# Patient Record
Sex: FEMALE | Race: WHITE | NOT HISPANIC OR LATINO
[De-identification: names, ages, dates, MRNs, and addresses within clinical notes are randomized per-mention and may not be internally consistent; named-entity substitution may affect disease eponyms.]

---

## 2017-07-13 ENCOUNTER — APPOINTMENT (OUTPATIENT)
Dept: PULMONOLOGY | Facility: CLINIC | Age: 72
End: 2017-07-13

## 2017-07-13 VITALS
OXYGEN SATURATION: 99 % | DIASTOLIC BLOOD PRESSURE: 60 MMHG | HEART RATE: 67 BPM | SYSTOLIC BLOOD PRESSURE: 110 MMHG | BODY MASS INDEX: 25.15 KG/M2 | WEIGHT: 142 LBS

## 2018-01-08 ENCOUNTER — APPOINTMENT (OUTPATIENT)
Dept: PULMONOLOGY | Facility: CLINIC | Age: 73
End: 2018-01-08
Payer: MEDICARE

## 2018-01-08 VITALS
WEIGHT: 144 LBS | BODY MASS INDEX: 25.51 KG/M2 | SYSTOLIC BLOOD PRESSURE: 125 MMHG | OXYGEN SATURATION: 99 % | DIASTOLIC BLOOD PRESSURE: 55 MMHG | HEART RATE: 67 BPM

## 2018-01-08 DIAGNOSIS — Z23 ENCOUNTER FOR IMMUNIZATION: ICD-10-CM

## 2018-01-08 PROCEDURE — 94010 BREATHING CAPACITY TEST: CPT

## 2018-01-08 PROCEDURE — 99214 OFFICE O/P EST MOD 30 MIN: CPT | Mod: 25

## 2018-01-08 RX ORDER — BRIMONIDINE TARTRATE 1 MG/ML
0.1 SOLUTION/ DROPS OPHTHALMIC
Refills: 0 | Status: ACTIVE | COMMUNITY

## 2018-01-09 ENCOUNTER — MED ADMIN CHARGE (OUTPATIENT)
Age: 73
End: 2018-01-09

## 2018-03-16 ENCOUNTER — MEDICATION RENEWAL (OUTPATIENT)
Age: 73
End: 2018-03-16

## 2018-05-07 ENCOUNTER — APPOINTMENT (OUTPATIENT)
Dept: PULMONOLOGY | Facility: CLINIC | Age: 73
End: 2018-05-07
Payer: MEDICARE

## 2018-05-07 VITALS
SYSTOLIC BLOOD PRESSURE: 122 MMHG | DIASTOLIC BLOOD PRESSURE: 80 MMHG | BODY MASS INDEX: 25.69 KG/M2 | HEART RATE: 72 BPM | WEIGHT: 145 LBS | OXYGEN SATURATION: 99 % | RESPIRATION RATE: 16 BRPM

## 2018-05-07 PROCEDURE — 99215 OFFICE O/P EST HI 40 MIN: CPT | Mod: 25

## 2018-05-07 PROCEDURE — 94010 BREATHING CAPACITY TEST: CPT

## 2018-05-18 ENCOUNTER — MEDICATION RENEWAL (OUTPATIENT)
Age: 73
End: 2018-05-18

## 2018-11-15 ENCOUNTER — APPOINTMENT (OUTPATIENT)
Dept: PULMONOLOGY | Facility: CLINIC | Age: 73
End: 2018-11-15
Payer: MEDICARE

## 2018-11-15 VITALS
RESPIRATION RATE: 16 BRPM | SYSTOLIC BLOOD PRESSURE: 122 MMHG | HEART RATE: 74 BPM | DIASTOLIC BLOOD PRESSURE: 80 MMHG | OXYGEN SATURATION: 98 % | BODY MASS INDEX: 24.62 KG/M2 | WEIGHT: 139 LBS

## 2018-11-15 PROCEDURE — 99214 OFFICE O/P EST MOD 30 MIN: CPT

## 2019-02-26 ENCOUNTER — MEDICATION RENEWAL (OUTPATIENT)
Age: 74
End: 2019-02-26

## 2019-04-15 ENCOUNTER — APPOINTMENT (OUTPATIENT)
Dept: PULMONOLOGY | Facility: CLINIC | Age: 74
End: 2019-04-15
Payer: MEDICARE

## 2019-04-15 VITALS — WEIGHT: 140 LBS | BODY MASS INDEX: 24.8 KG/M2

## 2019-04-15 VITALS — SYSTOLIC BLOOD PRESSURE: 124 MMHG | HEART RATE: 83 BPM | DIASTOLIC BLOOD PRESSURE: 60 MMHG | OXYGEN SATURATION: 99 %

## 2019-04-15 PROCEDURE — 94664 DEMO&/EVAL PT USE INHALER: CPT | Mod: 59

## 2019-04-15 PROCEDURE — 94060 EVALUATION OF WHEEZING: CPT

## 2019-04-15 PROCEDURE — 99214 OFFICE O/P EST MOD 30 MIN: CPT | Mod: 25

## 2019-04-15 RX ORDER — LOSARTAN POTASSIUM 25 MG/1
25 TABLET, FILM COATED ORAL
Refills: 0 | Status: ACTIVE | COMMUNITY
Start: 2019-04-15

## 2019-04-15 NOTE — DISCUSSION/SUMMARY
[FreeTextEntry1] : Asthma with reversible restriction, worsened by allergies\par remains on advair bid, allegra and mucinex\par exam without bronchospasm, normal sp02\par medrol taper\par recent CXR 1/18 negative, never smoker\par Cardiology following\par Will reevaluate in 4  months or sooner if needed with andrew\par remains off amiodarone, had ablation 2 yrs ago, on asa\par

## 2019-04-15 NOTE — PROCEDURE
[FreeTextEntry1] : spirometry with reversible restriction, decreased flows from 5/18\par CXR 1/18 negative

## 2019-04-15 NOTE — CONSULT LETTER
[Consult Letter:] : I had the pleasure of evaluating your patient, [unfilled]. [Dear  ___] : Dear  [unfilled], [Please see my note below.] : Please see my note below. [Sincerely,] : Sincerely, [DrBarbara  ___] : Dr. HILL [Director, Respiratory Care] : Director, Respiratory Care [[facility], [facility]] : [facility], [facility] [[facility]] : [facility] [FreeTextEntry3] : [facility]\par Pul[facility] Critical Care\par Director Pulmonary Division\par Medical Director Respiratory Therapy\par [facility]\par \par

## 2019-04-15 NOTE — HISTORY OF PRESENT ILLNESS
[FreeTextEntry1] : no sig dyspnea\par No fever chills or chest pain\par no sputum\par using advair bid\par has home neb and prn rescue\par

## 2019-04-15 NOTE — PHYSICAL EXAM
[Normal Appearance] : normal appearance [General Appearance - Well Developed] : well developed [Well Groomed] : well groomed [General Appearance - Well Nourished] : well nourished [No Deformities] : no deformities [General Appearance - In No Acute Distress] : no acute distress [Murmurs] : no murmurs present [Heart Sounds] : normal S1 and S2 [Heart Rate And Rhythm] : heart rate and rhythm were normal [Edema] : no peripheral edema present [Exaggerated Use Of Accessory Muscles For Inspiration] : no accessory muscle use [Respiration, Rhythm And Depth] : normal respiratory rhythm and effort [Auscultation Breath Sounds / Voice Sounds] : lungs were clear to auscultation bilaterally [Abnormal Walk] : normal gait [] : no rash [Oriented To Time, Place, And Person] : oriented to person, place, and time [No Focal Deficits] : no focal deficits [Impaired Insight] : insight and judgment were intact [Memory Recent] : recent memory was not impaired [Affect] : the affect was normal [Cyanosis, Localized] : no localized cyanosis [Nail Clubbing] : no clubbing of the fingernails [FreeTextEntry1] : no chest wall abn

## 2019-07-12 ENCOUNTER — RX RENEWAL (OUTPATIENT)
Age: 74
End: 2019-07-12

## 2019-08-15 ENCOUNTER — APPOINTMENT (OUTPATIENT)
Dept: PULMONOLOGY | Facility: CLINIC | Age: 74
End: 2019-08-15
Payer: MEDICARE

## 2019-08-15 VITALS — HEIGHT: 63 IN

## 2019-08-15 VITALS
OXYGEN SATURATION: 99 % | SYSTOLIC BLOOD PRESSURE: 130 MMHG | HEART RATE: 92 BPM | DIASTOLIC BLOOD PRESSURE: 80 MMHG | BODY MASS INDEX: 24.27 KG/M2 | WEIGHT: 137 LBS

## 2019-08-15 PROCEDURE — 99214 OFFICE O/P EST MOD 30 MIN: CPT | Mod: 25

## 2019-08-15 PROCEDURE — 94010 BREATHING CAPACITY TEST: CPT

## 2019-08-15 NOTE — PHYSICAL EXAM
[General Appearance - Well Developed] : well developed [Normal Appearance] : normal appearance [Well Groomed] : well groomed [General Appearance - Well Nourished] : well nourished [No Deformities] : no deformities [General Appearance - In No Acute Distress] : no acute distress [Heart Rate And Rhythm] : heart rate and rhythm were normal [Heart Sounds] : normal S1 and S2 [Murmurs] : no murmurs present [Edema] : no peripheral edema present [Respiration, Rhythm And Depth] : normal respiratory rhythm and effort [Exaggerated Use Of Accessory Muscles For Inspiration] : no accessory muscle use [Auscultation Breath Sounds / Voice Sounds] : lungs were clear to auscultation bilaterally [Abnormal Walk] : normal gait [] : no rash [No Focal Deficits] : no focal deficits [Oriented To Time, Place, And Person] : oriented to person, place, and time [Affect] : the affect was normal [Impaired Insight] : insight and judgment were intact [Memory Recent] : recent memory was not impaired [Nail Clubbing] : no clubbing of the fingernails [Cyanosis, Localized] : no localized cyanosis [FreeTextEntry1] : no chest wall abn

## 2019-08-15 NOTE — DISCUSSION/SUMMARY
[FreeTextEntry1] : Asthma by hx, has mild restriction, worsening fatigue more than dyspnea per pt\par exam without bronchospasm, normal sp02, spirometry improved\par hemolytic anemia work up in progress, CT scan planned\par does not want ER currently\par remains on advair bid,\par recent CXR 7/19 negative, never smoker\par Cardiology also following\par Will reevaluate in 3  months or sooner if needed with andrew\par remains off amiodarone, had ablation 2 yrs ago, on asa\par

## 2019-08-15 NOTE — HISTORY OF PRESENT ILLNESS
[FreeTextEntry1] : saw Hematology, Hb decreasing, getting labs, and CT scan through hematology\par more dyspnea\par no edema, no irregular beats\par No fever chills or chest pain\par no sputum\par using advair bid\par has home neb and prn rescue\par has Cardiology eval planned\par

## 2019-08-15 NOTE — CONSULT LETTER
[Dear  ___] : Dear  [unfilled], [Consult Letter:] : I had the pleasure of evaluating your patient, [unfilled]. [Sincerely,] : Sincerely, [Please see my note below.] : Please see my note below. [DrBarbara  ___] : Dr. HILL [[facility], [facility]] : [facility], [facility] [Director, Respiratory Care] : Director, Respiratory Care [[facility]] : [facility] [FreeTextEntry3] : [facility]\par Pul[facility] Critical Care\par Director Pulmonary Division\par Medical Director Respiratory Therapy\par [facility]\par \par  [DrBarbara ___] : Dr. HILL

## 2019-11-21 ENCOUNTER — APPOINTMENT (OUTPATIENT)
Dept: PULMONOLOGY | Facility: CLINIC | Age: 74
End: 2019-11-21
Payer: MEDICARE

## 2019-11-21 VITALS
HEIGHT: 63 IN | BODY MASS INDEX: 23.92 KG/M2 | DIASTOLIC BLOOD PRESSURE: 64 MMHG | WEIGHT: 135 LBS | SYSTOLIC BLOOD PRESSURE: 112 MMHG

## 2019-11-21 VITALS — OXYGEN SATURATION: 99 % | HEART RATE: 84 BPM

## 2019-11-21 PROCEDURE — 99214 OFFICE O/P EST MOD 30 MIN: CPT | Mod: 25

## 2019-11-21 PROCEDURE — 90732 PPSV23 VACC 2 YRS+ SUBQ/IM: CPT

## 2019-11-21 PROCEDURE — G0009: CPT

## 2019-11-21 RX ORDER — FOLIC ACID 1 MG/1
1 TABLET ORAL
Refills: 0 | Status: ACTIVE | COMMUNITY
Start: 2019-11-21

## 2019-11-21 RX ORDER — IRON/IRON ASP GLY/FA/MV-MIN 38 125-25-1MG
TABLET ORAL
Refills: 0 | Status: ACTIVE | COMMUNITY

## 2019-11-21 RX ORDER — ASPIRIN 81 MG/1
81 TABLET ORAL
Refills: 0 | Status: ACTIVE | COMMUNITY
Start: 2019-11-21

## 2019-11-21 NOTE — DISCUSSION/SUMMARY
[FreeTextEntry1] : Asthma with reversible restriction, at baseline\par remains on advair bid, allegra and mucinex\par exam without bronchospasm, normal sp02\par recent CT chest 8/19 with apical scarring, needs repeat 6 months\par Cardiology following\par Will reevaluate in 6  months or sooner if needed with andrew\par now on MTX\par vaccinations this fall, pneumo 23 given\par

## 2019-11-21 NOTE — HISTORY OF PRESENT ILLNESS
[FreeTextEntry1] : no sig dyspnea\par No fever chills or chest pain\par no sputum\par using advair bid\par has home neb and prn rescue\par anemia stable saw Hematology\par recently diagnosed as seronegative RA, on MTX \par

## 2019-11-21 NOTE — PHYSICAL EXAM
[General Appearance - Well Developed] : well developed [Normal Appearance] : normal appearance [Well Groomed] : well groomed [General Appearance - Well Nourished] : well nourished [No Deformities] : no deformities [General Appearance - In No Acute Distress] : no acute distress [Heart Rate And Rhythm] : heart rate and rhythm were normal [Heart Sounds] : normal S1 and S2 [Murmurs] : no murmurs present [Edema] : no peripheral edema present [Respiration, Rhythm And Depth] : normal respiratory rhythm and effort [Exaggerated Use Of Accessory Muscles For Inspiration] : no accessory muscle use [Auscultation Breath Sounds / Voice Sounds] : lungs were clear to auscultation bilaterally [FreeTextEntry1] : no chest wall abn [Abnormal Walk] : normal gait [] : no rash [No Focal Deficits] : no focal deficits [Oriented To Time, Place, And Person] : oriented to person, place, and time [Impaired Insight] : insight and judgment were intact [Affect] : the affect was normal [Memory Recent] : recent memory was not impaired [Nail Clubbing] : no clubbing of the fingernails [Cyanosis, Localized] : no localized cyanosis

## 2019-11-21 NOTE — CONSULT LETTER
[Dear  ___] : Dear  [unfilled], [Consult Letter:] : I had the pleasure of evaluating your patient, [unfilled]. [Please see my note below.] : Please see my note below. [Sincerely,] : Sincerely, [FreeTextEntry3] : [facility] DO [facility]\par Pulmonary Critical Care\par Director Pulmonary Division\par Medical Director Respiratory Therapy\par [facility]\par \par  [DrBarbara  ___] : Dr. HILL [[facility], [facility]] : [facility], [facility] [Director, Respiratory Care] : Director, Respiratory Care [[facility]] : [facility]

## 2020-04-22 ENCOUNTER — RX RENEWAL (OUTPATIENT)
Age: 75
End: 2020-04-22

## 2020-04-23 RX ORDER — ALBUTEROL SULFATE 90 UG/1
108 (90 BASE) INHALANT RESPIRATORY (INHALATION)
Qty: 1 | Refills: 3 | Status: DISCONTINUED | COMMUNITY
Start: 2020-04-22 | End: 2020-04-23

## 2020-04-23 RX ORDER — ALBUTEROL SULFATE 2.5 MG/3ML
(2.5 MG/3ML) SOLUTION RESPIRATORY (INHALATION)
Qty: 1620 | Refills: 3 | Status: DISCONTINUED | COMMUNITY
Start: 2019-04-15 | End: 2020-04-23

## 2020-04-23 RX ORDER — METHYLPREDNISOLONE 4 MG/1
4 TABLET ORAL
Qty: 1 | Refills: 3 | Status: DISCONTINUED | COMMUNITY
Start: 2019-04-15 | End: 2020-04-23

## 2020-05-19 ENCOUNTER — APPOINTMENT (OUTPATIENT)
Dept: PULMONOLOGY | Facility: CLINIC | Age: 75
End: 2020-05-19

## 2020-08-26 ENCOUNTER — APPOINTMENT (OUTPATIENT)
Dept: PULMONOLOGY | Facility: CLINIC | Age: 75
End: 2020-08-26
Payer: MEDICARE

## 2020-08-26 VITALS — SYSTOLIC BLOOD PRESSURE: 124 MMHG | DIASTOLIC BLOOD PRESSURE: 58 MMHG | BODY MASS INDEX: 23.91 KG/M2 | WEIGHT: 135 LBS

## 2020-08-26 VITALS — HEART RATE: 80 BPM | OXYGEN SATURATION: 98 %

## 2020-08-26 PROCEDURE — 99214 OFFICE O/P EST MOD 30 MIN: CPT

## 2020-08-26 RX ORDER — METHOTREXATE 2.5 MG/1
2.5 TABLET ORAL
Refills: 0 | Status: DISCONTINUED | COMMUNITY
Start: 2019-11-21 | End: 2020-08-26

## 2020-08-26 RX ORDER — ABATACEPT 250 MG/15ML
250 INJECTION, POWDER, LYOPHILIZED, FOR SOLUTION INTRAVENOUS
Refills: 0 | Status: ACTIVE | COMMUNITY

## 2020-08-26 NOTE — PHYSICAL EXAM
[General Appearance - Well Developed] : well developed [Normal Appearance] : normal appearance [Well Groomed] : well groomed [General Appearance - Well Nourished] : well nourished [Heart Rate And Rhythm] : heart rate and rhythm were normal [No Deformities] : no deformities [General Appearance - In No Acute Distress] : no acute distress [Heart Sounds] : normal S1 and S2 [Murmurs] : no murmurs present [Edema] : no peripheral edema present [Respiration, Rhythm And Depth] : normal respiratory rhythm and effort [Exaggerated Use Of Accessory Muscles For Inspiration] : no accessory muscle use [Abnormal Walk] : normal gait [Auscultation Breath Sounds / Voice Sounds] : lungs were clear to auscultation bilaterally [FreeTextEntry1] : no chest wall abn [] : no rash [No Focal Deficits] : no focal deficits [Impaired Insight] : insight and judgment were intact [Oriented To Time, Place, And Person] : oriented to person, place, and time [Affect] : the affect was normal [Memory Recent] : recent memory was not impaired [Cyanosis, Localized] : no localized cyanosis [Nail Clubbing] : no clubbing of the fingernails

## 2020-08-26 NOTE — DISCUSSION/SUMMARY
[FreeTextEntry1] : Asthma with reversible restriction, at baseline\par remains on advair bid, allegra and mucinex\par exam without bronchospasm, normal sp02\par recent CT chest 1/20  with stable apical scarring 5 mm ( 8/19-1/20), repeat CT scan 1/21\par Cardiology following\par Will reevaluate in 6  months or sooner if needed with Pfts \par now on MTX, Orencia\par by hx TB testing negative\par vaccinations this fall,\par 6 months or sooner if needed\par

## 2020-08-26 NOTE — CONSULT LETTER
[Dear  ___] : Dear  [unfilled], [Consult Letter:] : I had the pleasure of evaluating your patient, [unfilled]. [Sincerely,] : Sincerely, [FreeTextEntry3] : [facility]\par Pul[facility] Critical Care\par Director Pulmonary Division\par Medical Director Respiratory Therapy\par [facility]\par \par  [Please see my note below.] : Please see my note below. [DrBarbara  ___] : Dr. HILL [[facility], [facility]] : [facility], [facility] [[facility]] : [facility] [Director, Respiratory Care] : Director, Respiratory Care

## 2020-08-26 NOTE — HISTORY OF PRESENT ILLNESS
[FreeTextEntry1] : \par  [TextBox_4] : no sig dyspnea\par No fever chills or chest pain\par no sputum\par using advair bid\par has home neb and prn rescue\par anemia stable saw Hematology\par  seronegative RA, on MTX , Orencia

## 2020-10-30 ENCOUNTER — RX RENEWAL (OUTPATIENT)
Age: 75
End: 2020-10-30

## 2021-05-25 ENCOUNTER — RX CHANGE (OUTPATIENT)
Age: 76
End: 2021-05-25

## 2021-05-26 ENCOUNTER — APPOINTMENT (OUTPATIENT)
Dept: PULMONOLOGY | Facility: CLINIC | Age: 76
End: 2021-05-26
Payer: MEDICARE

## 2021-05-26 VITALS
HEART RATE: 77 BPM | OXYGEN SATURATION: 98 % | SYSTOLIC BLOOD PRESSURE: 132 MMHG | DIASTOLIC BLOOD PRESSURE: 68 MMHG | BODY MASS INDEX: 24.98 KG/M2 | HEIGHT: 63 IN | RESPIRATION RATE: 16 BRPM | WEIGHT: 141 LBS | TEMPERATURE: 97 F

## 2021-05-26 PROCEDURE — 99213 OFFICE O/P EST LOW 20 MIN: CPT

## 2021-05-26 PROCEDURE — 99072 ADDL SUPL MATRL&STAF TM PHE: CPT

## 2021-05-26 RX ORDER — TERIPARATIDE 250 UG/ML
600 INJECTION, SOLUTION SUBCUTANEOUS
Refills: 0 | Status: DISCONTINUED | COMMUNITY
Start: 2019-04-15 | End: 2021-05-26

## 2021-05-26 RX ORDER — METHOTREXATE 2.5 MG/1
2.5 TABLET ORAL
Refills: 0 | Status: DISCONTINUED | COMMUNITY
End: 2021-05-26

## 2021-05-26 NOTE — CONSULT LETTER
[Dear  ___] : Dear  [unfilled], [Consult Letter:] : I had the pleasure of evaluating your patient, [unfilled]. [Please see my note below.] : Please see my note below. [Sincerely,] : Sincerely, [DrBarbara  ___] : Dr. HILL [[facility], [facility]] : [facility], [facility] [Director, Respiratory Care] : Director, Respiratory Care [[facility]] : [facility] [FreeTextEntry3] : [facility] DO [facility]\par Pulmonary Critical Care\par Director Pulmonary Division\par Medical Director Respiratory Therapy\par [facility]\par \par

## 2021-05-26 NOTE — DISCUSSION/SUMMARY
[FreeTextEntry1] : Asthma with reversible restriction, at baseline\par remains on advair bid, allegra and mucinex\par exam without bronchospasm, normal sp02\par recent CT chest 2/21  with stable apical scarring 5 mm ( 8/19-2/21\par Cardiology following\par Will reevaluate in 6  months or sooner if needed , refuses PFts currently\par now on MTX, Orencia\par by hx TB testing negative\par Had Covid vaccination\par 6 months or sooner if needed\par

## 2021-05-26 NOTE — HISTORY OF PRESENT ILLNESS
[TextBox_4] : no sig dyspnea\par No fever chills or chest pain\par no sputum\par using advair bid\par has home neb and prn rescue\par anemia stable saw Hematology\par  seronegative RA, on MTX , Orencia [FreeTextEntry1] : \par

## 2021-05-26 NOTE — END OF VISIT
[>50% of the face to face encounter time was spent on counseling and/or coordination of care for ___] : Greater than 50% of the face to face encounter time was spent on counseling and/or coordination of care for [unfilled] [>50% of Time Spent on Counseling and Coordination of Care for  ___] : Greater than 50% of the encounter time was spent on counseling and coordination of care for [unfilled] [Time Spent: ___ minutes] : I have spent [unfilled] minutes of face to face time with the patient

## 2021-05-26 NOTE — PHYSICAL EXAM
[General Appearance - Well Developed] : well developed [Normal Appearance] : normal appearance [Well Groomed] : well groomed [General Appearance - Well Nourished] : well nourished [No Deformities] : no deformities [General Appearance - In No Acute Distress] : no acute distress [Heart Rate And Rhythm] : heart rate and rhythm were normal [Heart Sounds] : normal S1 and S2 [Murmurs] : no murmurs present [Edema] : no peripheral edema present [Respiration, Rhythm And Depth] : normal respiratory rhythm and effort [Exaggerated Use Of Accessory Muscles For Inspiration] : no accessory muscle use [Auscultation Breath Sounds / Voice Sounds] : lungs were clear to auscultation bilaterally [Abnormal Walk] : normal gait [] : no rash [No Focal Deficits] : no focal deficits [Oriented To Time, Place, And Person] : oriented to person, place, and time [Impaired Insight] : insight and judgment were intact [Affect] : the affect was normal [Memory Recent] : recent memory was not impaired [Nail Clubbing] : no clubbing of the fingernails [Cyanosis, Localized] : no localized cyanosis [FreeTextEntry1] : no chest wall abn

## 2021-06-23 ENCOUNTER — RX CHANGE (OUTPATIENT)
Age: 76
End: 2021-06-23

## 2021-08-19 ENCOUNTER — RX CHANGE (OUTPATIENT)
Age: 76
End: 2021-08-19

## 2021-10-06 ENCOUNTER — RX CHANGE (OUTPATIENT)
Age: 76
End: 2021-10-06

## 2021-11-08 ENCOUNTER — APPOINTMENT (OUTPATIENT)
Dept: PULMONOLOGY | Facility: CLINIC | Age: 76
End: 2021-11-08
Payer: MEDICARE

## 2021-11-08 VITALS — DIASTOLIC BLOOD PRESSURE: 80 MMHG | SYSTOLIC BLOOD PRESSURE: 146 MMHG | HEART RATE: 86 BPM | OXYGEN SATURATION: 98 %

## 2021-11-08 PROCEDURE — 99213 OFFICE O/P EST LOW 20 MIN: CPT

## 2021-11-08 NOTE — HISTORY OF PRESENT ILLNESS
[TextBox_4] : no sig dyspnea\par No fever chills or chest pain\par no sputum\par using advair bid\par has home neb and prn rescue\par anemia stable saw Hematology\par  seronegative RA, on  Orencia\par Had Moderna vaccine booster [FreeTextEntry1] : \par

## 2021-11-08 NOTE — DISCUSSION/SUMMARY
[FreeTextEntry1] : Asthma with reversible restriction, at baseline\par remains on advair bid, allegra and mucinex\par exam without bronchospasm, normal sp02\par recent CT chest 2/21  with stable apical scarring 5 mm ( 8/19-2/21\par Cardiology following\par Will reevaluate in 6  months or sooner if needed , refuses PFts currently\par now on Orencia\par by hx TB testing negative\par Had Covid vaccination and Booster Moderna\par 6 months or sooner if needed\par

## 2021-11-08 NOTE — CONSULT LETTER
[Dear  ___] : Dear  [unfilled], [Consult Letter:] : I had the pleasure of evaluating your patient, [unfilled]. [Please see my note below.] : Please see my note below. [Sincerely,] : Sincerely, [FreeTextEntry3] : [facility] DO [facility]\par Pulmonary Critical Care\par Director Pulmonary Division\par Medical Director Respiratory Therapy\par [facility]\par \par  [DrBarbara  ___] : Dr. HILL [[facility]yn[facility] Taylor DO, [facility]] : Mynor Taylor DO, [facility] [Director, Respiratory Care] : Director, Respiratory Care [[facility]] : [facility]

## 2021-12-10 ENCOUNTER — RX CHANGE (OUTPATIENT)
Age: 76
End: 2021-12-10

## 2022-03-04 ENCOUNTER — RX CHANGE (OUTPATIENT)
Age: 77
End: 2022-03-04

## 2022-04-15 ENCOUNTER — RX CHANGE (OUTPATIENT)
Age: 77
End: 2022-04-15

## 2022-05-12 ENCOUNTER — NON-APPOINTMENT (OUTPATIENT)
Age: 77
End: 2022-05-12

## 2022-05-12 ENCOUNTER — APPOINTMENT (OUTPATIENT)
Dept: PULMONOLOGY | Facility: CLINIC | Age: 77
End: 2022-05-12
Payer: MEDICARE

## 2022-05-12 VITALS
DIASTOLIC BLOOD PRESSURE: 78 MMHG | BODY MASS INDEX: 26.58 KG/M2 | RESPIRATION RATE: 16 BRPM | HEIGHT: 63 IN | OXYGEN SATURATION: 97 % | WEIGHT: 150 LBS | HEART RATE: 84 BPM | SYSTOLIC BLOOD PRESSURE: 138 MMHG

## 2022-05-12 DIAGNOSIS — R91.8 OTHER NONSPECIFIC ABNORMAL FINDING OF LUNG FIELD: ICD-10-CM

## 2022-05-12 PROCEDURE — 99214 OFFICE O/P EST MOD 30 MIN: CPT

## 2022-05-12 NOTE — DISCUSSION/SUMMARY
[FreeTextEntry1] : Asthma with reversible restriction, at baseline\par remains on advair bid, allegra and mucinex\par exam without bronchospasm, normal sp02\par recent CT chest 2/21  with stable apical scarring 5 mm ( 8/19-2/21\par Cardiology following\par Will reevaluate in 6  months or sooner if needed , refuses PFts currently\par now on Orencia\par by hx TB testing negative\par Had Covid vaccination and Booster Moderna x 2\par 6 months or sooner if needed\par

## 2022-05-12 NOTE — CONSULT LETTER
[Dear  ___] : Dear  [unfilled], [Consult Letter:] : I had the pleasure of evaluating your patient, [unfilled]. [Please see my note below.] : Please see my note below. [Sincerely,] : Sincerely, [DrBarbara  ___] : Dr. HILL [[facility] DO, [facility]] : Mynor Taylor DO, [facility] [Director, Respiratory Care] : Director, Respiratory Care [[facility]] : [facility] [FreeTextEntry3] : [facility]\par Pulmonary Critical Care\par Director Pulmonary Division\par Medical Director Respiratory Therapy\par [facility]\par \par

## 2022-05-12 NOTE — HISTORY OF PRESENT ILLNESS
[TextBox_4] : no sig dyspnea\par No fever chills or chest pain\par no sputum\par using advair bid\par has home neb and prn rescue\par anemia stable saw Hematology\par  seronegative RA, on  Orencia\par Had Moderna vaccine booster x 2 [FreeTextEntry1] : \par

## 2022-07-13 ENCOUNTER — RX RENEWAL (OUTPATIENT)
Age: 77
End: 2022-07-13

## 2022-08-11 ENCOUNTER — RX RENEWAL (OUTPATIENT)
Age: 77
End: 2022-08-11

## 2022-08-19 ENCOUNTER — LABORATORY RESULT (OUTPATIENT)
Age: 77
End: 2022-08-19

## 2022-08-19 ENCOUNTER — OUTPATIENT (OUTPATIENT)
Dept: OUTPATIENT SERVICES | Facility: HOSPITAL | Age: 77
LOS: 1 days | End: 2022-08-19
Payer: MEDICARE

## 2022-08-19 ENCOUNTER — RESULT REVIEW (OUTPATIENT)
Age: 77
End: 2022-08-19

## 2022-08-19 DIAGNOSIS — Z01.818 ENCOUNTER FOR OTHER PREPROCEDURAL EXAMINATION: ICD-10-CM

## 2022-08-19 LAB
ANION GAP SERPL CALC-SCNC: 11 MMOL/L — SIGNIFICANT CHANGE UP (ref 5–17)
APTT BLD: 32.9 SEC — SIGNIFICANT CHANGE UP (ref 27.5–35.5)
BASOPHILS # BLD AUTO: 0.02 K/UL — SIGNIFICANT CHANGE UP (ref 0–0.2)
BASOPHILS NFR BLD AUTO: 0.3 % — SIGNIFICANT CHANGE UP (ref 0–2)
BUN SERPL-MCNC: 18.7 MG/DL — SIGNIFICANT CHANGE UP (ref 8–20)
CALCIUM SERPL-MCNC: 9.5 MG/DL — SIGNIFICANT CHANGE UP (ref 8.4–10.5)
CHLORIDE SERPL-SCNC: 102 MMOL/L — SIGNIFICANT CHANGE UP (ref 98–107)
CO2 SERPL-SCNC: 27 MMOL/L — SIGNIFICANT CHANGE UP (ref 22–29)
CREAT SERPL-MCNC: 0.69 MG/DL — SIGNIFICANT CHANGE UP (ref 0.5–1.3)
EGFR: 91 ML/MIN/1.73M2 — SIGNIFICANT CHANGE UP
EOSINOPHIL # BLD AUTO: 0.11 K/UL — SIGNIFICANT CHANGE UP (ref 0–0.5)
EOSINOPHIL NFR BLD AUTO: 1.6 % — SIGNIFICANT CHANGE UP (ref 0–6)
GLUCOSE SERPL-MCNC: 86 MG/DL — SIGNIFICANT CHANGE UP (ref 70–99)
HCT VFR BLD CALC: 38.2 % — SIGNIFICANT CHANGE UP (ref 34.5–45)
HGB BLD-MCNC: 12.6 G/DL — SIGNIFICANT CHANGE UP (ref 11.5–15.5)
IMM GRANULOCYTES NFR BLD AUTO: 0.4 % — SIGNIFICANT CHANGE UP (ref 0–1.5)
INR BLD: 1.02 RATIO — SIGNIFICANT CHANGE UP (ref 0.88–1.16)
LYMPHOCYTES # BLD AUTO: 1.21 K/UL — SIGNIFICANT CHANGE UP (ref 1–3.3)
LYMPHOCYTES # BLD AUTO: 17.2 % — SIGNIFICANT CHANGE UP (ref 13–44)
MCHC RBC-ENTMCNC: 32 PG — SIGNIFICANT CHANGE UP (ref 27–34)
MCHC RBC-ENTMCNC: 33 GM/DL — SIGNIFICANT CHANGE UP (ref 32–36)
MCV RBC AUTO: 97 FL — SIGNIFICANT CHANGE UP (ref 80–100)
MONOCYTES # BLD AUTO: 0.69 K/UL — SIGNIFICANT CHANGE UP (ref 0–0.9)
MONOCYTES NFR BLD AUTO: 9.8 % — SIGNIFICANT CHANGE UP (ref 2–14)
NEUTROPHILS # BLD AUTO: 4.99 K/UL — SIGNIFICANT CHANGE UP (ref 1.8–7.4)
NEUTROPHILS NFR BLD AUTO: 70.7 % — SIGNIFICANT CHANGE UP (ref 43–77)
PLATELET # BLD AUTO: 213 K/UL — SIGNIFICANT CHANGE UP (ref 150–400)
POTASSIUM SERPL-MCNC: 4.1 MMOL/L — SIGNIFICANT CHANGE UP (ref 3.5–5.3)
POTASSIUM SERPL-SCNC: 4.1 MMOL/L — SIGNIFICANT CHANGE UP (ref 3.5–5.3)
PROTHROM AB SERPL-ACNC: 11.8 SEC — SIGNIFICANT CHANGE UP (ref 10.5–13.4)
RBC # BLD: 3.94 M/UL — SIGNIFICANT CHANGE UP (ref 3.8–5.2)
RBC # FLD: 14.4 % — SIGNIFICANT CHANGE UP (ref 10.3–14.5)
SODIUM SERPL-SCNC: 140 MMOL/L — SIGNIFICANT CHANGE UP (ref 135–145)
WBC # BLD: 7.05 K/UL — SIGNIFICANT CHANGE UP (ref 3.8–10.5)
WBC # FLD AUTO: 7.05 K/UL — SIGNIFICANT CHANGE UP (ref 3.8–10.5)

## 2022-08-19 PROCEDURE — 85025 COMPLETE CBC W/AUTO DIFF WBC: CPT

## 2022-08-19 PROCEDURE — G0463: CPT

## 2022-08-19 PROCEDURE — 71046 X-RAY EXAM CHEST 2 VIEWS: CPT | Mod: 26

## 2022-08-19 PROCEDURE — 85730 THROMBOPLASTIN TIME PARTIAL: CPT

## 2022-08-19 PROCEDURE — 36415 COLL VENOUS BLD VENIPUNCTURE: CPT

## 2022-08-19 PROCEDURE — 85610 PROTHROMBIN TIME: CPT

## 2022-08-19 PROCEDURE — 80048 BASIC METABOLIC PNL TOTAL CA: CPT

## 2022-08-19 PROCEDURE — 71046 X-RAY EXAM CHEST 2 VIEWS: CPT

## 2022-08-22 ENCOUNTER — APPOINTMENT (OUTPATIENT)
Dept: PULMONOLOGY | Facility: CLINIC | Age: 77
End: 2022-08-22

## 2022-08-22 VITALS
OXYGEN SATURATION: 96 % | HEIGHT: 63 IN | WEIGHT: 150 LBS | BODY MASS INDEX: 26.58 KG/M2 | SYSTOLIC BLOOD PRESSURE: 130 MMHG | HEART RATE: 85 BPM | DIASTOLIC BLOOD PRESSURE: 82 MMHG

## 2022-08-22 DIAGNOSIS — Z01.811 ENCOUNTER FOR PREPROCEDURAL RESPIRATORY EXAMINATION: ICD-10-CM

## 2022-08-22 DIAGNOSIS — R91.1 SOLITARY PULMONARY NODULE: ICD-10-CM

## 2022-08-22 PROCEDURE — 99213 OFFICE O/P EST LOW 20 MIN: CPT | Mod: 25

## 2022-08-22 PROCEDURE — 94010 BREATHING CAPACITY TEST: CPT

## 2022-08-22 RX ORDER — TRAMADOL HYDROCHLORIDE 50 MG/1
50 TABLET, COATED ORAL
Qty: 21 | Refills: 0 | Status: ACTIVE | COMMUNITY
Start: 2022-07-27

## 2022-08-22 RX ORDER — MELOXICAM 15 MG/1
15 TABLET ORAL
Qty: 30 | Refills: 0 | Status: ACTIVE | COMMUNITY
Start: 2022-06-16

## 2022-08-22 RX ORDER — GABAPENTIN 300 MG/1
300 CAPSULE ORAL
Qty: 60 | Refills: 0 | Status: ACTIVE | COMMUNITY
Start: 2022-06-03

## 2022-08-22 RX ORDER — LEVOTHYROXINE SODIUM 0.09 MG/1
88 TABLET ORAL
Qty: 30 | Refills: 0 | Status: ACTIVE | COMMUNITY
Start: 2021-12-16

## 2022-08-22 RX ORDER — NEOMYCIN SULFATE, POLYMYXIN B SULFATE AND DEXAMETHASONE 3.5; 10000; 1 MG/ML; [USP'U]/ML; MG/ML
3.5-10000-0.1 SUSPENSION OPHTHALMIC
Qty: 5 | Refills: 0 | Status: ACTIVE | COMMUNITY
Start: 2022-08-04

## 2022-08-22 RX ORDER — FLUTICASONE PROPIONATE 50 UG/1
50 SPRAY, METERED NASAL
Qty: 16 | Refills: 0 | Status: ACTIVE | COMMUNITY
Start: 2022-05-23

## 2022-08-22 RX ORDER — POTASSIUM CHLORIDE 600 MG/1
8 TABLET, FILM COATED, EXTENDED RELEASE ORAL
Qty: 30 | Refills: 0 | Status: ACTIVE | COMMUNITY
Start: 2022-05-26

## 2022-08-22 RX ORDER — DENOSUMAB 60 MG/ML
60 INJECTION SUBCUTANEOUS
Qty: 1 | Refills: 0 | Status: ACTIVE | COMMUNITY
Start: 2022-02-28

## 2022-08-22 RX ORDER — ATORVASTATIN CALCIUM 20 MG/1
20 TABLET, FILM COATED ORAL
Qty: 30 | Refills: 0 | Status: ACTIVE | COMMUNITY
Start: 2022-05-11

## 2022-08-22 NOTE — PROCEDURE
[FreeTextEntry1] : CT 2/21: stable nodule, 5mm apical nodule , no change  1/20\par \par spirometry today, mild restriction, no change from 8/19 ( limited study)

## 2022-08-22 NOTE — HISTORY OF PRESENT ILLNESS
[TextBox_4] : no sig dyspnea\par No fever chills or chest pain\par no sputum\par using advair bid\par has home neb and prn rescue\par anemia stable saw Hematology\par  seronegative RA, on  Orencia\par Had Moderna vaccine booster x 2\par For orthopedic surgery , L knee [FreeTextEntry1] : \par

## 2022-08-22 NOTE — CONSULT LETTER
[Dear  ___] : Dear  [unfilled], [Consult Letter:] : I had the pleasure of evaluating your patient, [unfilled]. [Please see my note below.] : Please see my note below. [Sincerely,] : Sincerely, [DrBarbara  ___] : Dr. HILL [[facility], [facility]] : [facility]or [facility] DO, [facility] [Director, Respiratory Care] : Director, Respiratory Care [[facility]] : [facility] [FreeTextEntry3] : [facility]\par Pulmonary Critical Care\par Director Pulmonary Division\par Medical Director Respiratory Therapy\par [facility]\par \par

## 2022-08-22 NOTE — DISCUSSION/SUMMARY
[FreeTextEntry1] : Asthma with reversible restriction, at baseline\par remains on advair bid, allegra and mucinex\par exam without bronchospasm, normal sp02\par recent CT chest 2/21  with stable apical scarring 5 mm ( 8/19-2/21, Quantiferon negative\par Cardiology following\par Had  Covid vaccination and Booster Moderna x 2\par 6 months or sooner if needed\par No pulmonary contraindication to orthopedic procedure\par Mild increased risk of pulmonary complications\par Continue Advair am of procedure\par Albuterol neb q 4-6 hrs prn\par incentive spirometry, DVT prophylaxis\par

## 2022-08-26 ENCOUNTER — RESULT REVIEW (OUTPATIENT)
Age: 77
End: 2022-08-26

## 2023-01-05 ENCOUNTER — OFFICE (OUTPATIENT)
Dept: URBAN - METROPOLITAN AREA CLINIC 63 | Facility: CLINIC | Age: 78
Setting detail: OPHTHALMOLOGY
End: 2023-01-05
Payer: MEDICARE

## 2023-01-05 DIAGNOSIS — H35.3131: ICD-10-CM

## 2023-01-05 DIAGNOSIS — H35.371: ICD-10-CM

## 2023-01-05 PROCEDURE — 92014 COMPRE OPH EXAM EST PT 1/>: CPT | Performed by: SPECIALIST

## 2023-01-05 PROCEDURE — 92235 FLUORESCEIN ANGRPH MLTIFRAME: CPT | Performed by: SPECIALIST

## 2023-01-05 PROCEDURE — 92134 CPTRZ OPH DX IMG PST SGM RTA: CPT | Performed by: SPECIALIST

## 2023-01-05 ASSESSMENT — CORNEAL DYSTROPHY - POSTERIOR
OD_POSTERIORDYSTROPHY: GUTTATA
OS_POSTERIORDYSTROPHY: GUTTATA

## 2023-01-05 ASSESSMENT — TONOMETRY
OS_IOP_MMHG: 20
OD_IOP_MMHG: 19

## 2023-01-05 ASSESSMENT — VISUAL ACUITY
OS_BCVA: 20/30-1
OD_BCVA: 20/30-1

## 2023-01-05 ASSESSMENT — CONFRONTATIONAL VISUAL FIELD TEST (CVF)
OS_FINDINGS: FULL
OD_FINDINGS: FULL

## 2023-01-18 ENCOUNTER — APPOINTMENT (OUTPATIENT)
Dept: PULMONOLOGY | Facility: CLINIC | Age: 78
End: 2023-01-18
Payer: MEDICARE

## 2023-01-18 VITALS
BODY MASS INDEX: 26.57 KG/M2 | OXYGEN SATURATION: 96 % | DIASTOLIC BLOOD PRESSURE: 72 MMHG | SYSTOLIC BLOOD PRESSURE: 136 MMHG | HEART RATE: 70 BPM | RESPIRATION RATE: 16 BRPM | WEIGHT: 150 LBS

## 2023-01-18 DIAGNOSIS — R06.09 OTHER FORMS OF DYSPNEA: ICD-10-CM

## 2023-01-18 PROCEDURE — 99214 OFFICE O/P EST MOD 30 MIN: CPT

## 2023-01-18 RX ORDER — AZITHROMYCIN 250 MG/1
250 TABLET, FILM COATED ORAL
Qty: 6 | Refills: 0 | Status: DISCONTINUED | COMMUNITY
Start: 2022-03-17 | End: 2023-01-18

## 2023-01-18 RX ORDER — FLUTICASONE PROPIONATE AND SALMETEROL 250; 50 UG/1; UG/1
250-50 POWDER RESPIRATORY (INHALATION) TWICE DAILY
Qty: 3 | Refills: 0 | Status: DISCONTINUED | COMMUNITY
Start: 2020-12-02 | End: 2023-01-18

## 2023-01-18 NOTE — PROCEDURE
[FreeTextEntry1] : CT 2/21: stable nodule, 5mm apical nodule , no change  1/20\par CXR 8/22 negative\par spirometry today, mild restriction, mild symmetrical decreased flows from 8/22

## 2023-01-18 NOTE — HISTORY OF PRESENT ILLNESS
[TextBox_4] : no sig dyspnea\par No fever chills or chest pain\par no sputum\par using Wixella bid\par more frequent rescue use\par no new env exposures, no sputum or rhinitis\par anemia stable saw Hematology\par  seronegative RA, on  Orencia\par Had Moderna vaccine booster x 2\par  [FreeTextEntry1] : \par

## 2023-01-18 NOTE — CONSULT LETTER
[Dear  ___] : Dear  [unfilled], [Consult Letter:] : I had the pleasure of evaluating your patient, [unfilled]. [Please see my note below.] : Please see my note below. [Sincerely,] : Sincerely, [DrBarbara  ___] : Dr. HILL [[facility] DO, [facility]] : [facility]ynor [facility] DO, [facility] [Director, Respiratory Care] : Director, Respiratory Care [[facility]] : [facility] [FreeTextEntry3] : [facility] DO [facility]\par Pulmonary Critical Care\par Director Pulmonary Division\par Medical Director Respiratory Therapy\par [facility]\par \par

## 2023-01-18 NOTE — DISCUSSION/SUMMARY
[FreeTextEntry1] : Asthma with reversible restriction, more recent rescue use by hx\par remains on Advair bid, prn recsue neb and HFA\par No active rhinitis/GERD\par exam without bronchospasm, normal sp02\par recent CT chest 2/21  with stable apical scarring 5 mm ( 8/19-2/21, Quantiferon negative\par Cardiology following\par Discussed with pt, she does not want steroid trial or further work up currently, will allow CXR, Cardiology follow up\par 6 months or sooner if needed, she will call if any change in status\par

## 2023-01-23 ENCOUNTER — APPOINTMENT (OUTPATIENT)
Dept: PULMONOLOGY | Facility: CLINIC | Age: 78
End: 2023-01-23

## 2023-03-27 ENCOUNTER — RX ONLY (RX ONLY)
Age: 78
End: 2023-03-27

## 2023-03-27 ENCOUNTER — OFFICE (OUTPATIENT)
Dept: URBAN - METROPOLITAN AREA CLINIC 63 | Facility: CLINIC | Age: 78
Setting detail: OPHTHALMOLOGY
End: 2023-03-27
Payer: MEDICARE

## 2023-03-27 DIAGNOSIS — H40.1131: ICD-10-CM

## 2023-03-27 DIAGNOSIS — H01.001: ICD-10-CM

## 2023-03-27 DIAGNOSIS — H04.123: ICD-10-CM

## 2023-03-27 DIAGNOSIS — H01.002: ICD-10-CM

## 2023-03-27 PROBLEM — H25.13 CATARACT SENILE NUCLEAR SCLEROSIS; BOTH EYES: Status: ACTIVE | Noted: 2023-03-27

## 2023-03-27 PROBLEM — H16.223 DRY EYE SYNDROME K SICCA; BOTH EYES: Status: ACTIVE | Noted: 2023-03-27

## 2023-03-27 PROCEDURE — 92133 CPTRZD OPH DX IMG PST SGM ON: CPT | Performed by: OPHTHALMOLOGY

## 2023-03-27 PROCEDURE — 99213 OFFICE O/P EST LOW 20 MIN: CPT | Performed by: OPHTHALMOLOGY

## 2023-03-27 ASSESSMENT — REFRACTION_CURRENTRX
OS_SPHERE: -3.75
OD_AXIS: 166
OD_OVR_VA: 20/
OD_SPHERE: -4.50
OD_CYLINDER: -1.50
OS_OVR_VA: 20/

## 2023-03-27 ASSESSMENT — KERATOMETRY
OD_K1POWER_DIOPTERS: 47.87
OD_K2POWER_DIOPTERS: 49.27
OD_AXISANGLE_DEGREES: 110
OS_K1POWER_DIOPTERS: UNABLE

## 2023-03-27 ASSESSMENT — PACHYMETRY
OS_CT_CORRECTION: -2
OD_CT_UM: 573
OS_CT_UM: 571
OD_CT_CORRECTION: -2

## 2023-03-27 ASSESSMENT — LID EXAM ASSESSMENTS
OS_BLEPHARITIS: LLL LUL 1+
OD_BLEPHARITIS: RLL RUL 1+
OD_EDEMA: RLL RUL 1+

## 2023-03-27 ASSESSMENT — TEAR BREAK UP TIME (TBUT)
OS_TBUT: 1+
OD_TBUT: 1+

## 2023-03-27 ASSESSMENT — REFRACTION_AUTOREFRACTION
OS_SPHERE: -3.00
OD_AXIS: 028
OD_CYLINDER: -2.25
OD_SPHERE: -3.00
OS_AXIS: 086
OS_CYLINDER: -1.50

## 2023-03-27 ASSESSMENT — VISUAL ACUITY
OS_BCVA: 20/60
OD_BCVA: 20/25

## 2023-03-27 ASSESSMENT — SPHEQUIV_DERIVED
OD_SPHEQUIV: -4.125
OS_SPHEQUIV: -3.75

## 2023-03-27 ASSESSMENT — CONFRONTATIONAL VISUAL FIELD TEST (CVF)
OS_FINDINGS: FULL
OD_FINDINGS: FULL

## 2023-03-27 ASSESSMENT — TONOMETRY
OD_IOP_MMHG: 12
OS_IOP_MMHG: 12

## 2023-03-27 ASSESSMENT — CORNEAL DYSTROPHY - POSTERIOR
OS_POSTERIORDYSTROPHY: 1+ GUTTATA
OD_POSTERIORDYSTROPHY: GUTTATA

## 2023-03-27 ASSESSMENT — AXIALLENGTH_DERIVED: OD_AL: 23.34

## 2023-05-05 ENCOUNTER — OFFICE (OUTPATIENT)
Dept: URBAN - METROPOLITAN AREA CLINIC 63 | Facility: CLINIC | Age: 78
Setting detail: OPHTHALMOLOGY
End: 2023-05-05
Payer: MEDICARE

## 2023-05-05 DIAGNOSIS — H35.3131: ICD-10-CM

## 2023-05-05 DIAGNOSIS — H35.371: ICD-10-CM

## 2023-05-05 PROCEDURE — 92134 CPTRZ OPH DX IMG PST SGM RTA: CPT | Performed by: SPECIALIST

## 2023-05-05 PROCEDURE — 92235 FLUORESCEIN ANGRPH MLTIFRAME: CPT | Performed by: SPECIALIST

## 2023-05-05 PROCEDURE — 92014 COMPRE OPH EXAM EST PT 1/>: CPT | Performed by: SPECIALIST

## 2023-05-05 ASSESSMENT — CONFRONTATIONAL VISUAL FIELD TEST (CVF)
OD_FINDINGS: FULL
OS_FINDINGS: FULL

## 2023-05-05 ASSESSMENT — VISUAL ACUITY
OS_BCVA: 20/40-
OD_BCVA: 20/25-

## 2023-05-05 ASSESSMENT — CORNEAL DYSTROPHY - POSTERIOR
OD_POSTERIORDYSTROPHY: GUTTATA
OS_POSTERIORDYSTROPHY: GUTTATA

## 2023-05-05 ASSESSMENT — TONOMETRY
OS_IOP_MMHG: 19
OD_IOP_MMHG: 18

## 2023-05-31 ENCOUNTER — RX RENEWAL (OUTPATIENT)
Age: 78
End: 2023-05-31

## 2023-05-31 RX ORDER — ALBUTEROL SULFATE 90 UG/1
108 (90 BASE) INHALANT RESPIRATORY (INHALATION)
Qty: 3 | Refills: 3 | Status: ACTIVE | COMMUNITY
Start: 2018-01-08 | End: 1900-01-01

## 2023-07-03 ENCOUNTER — RX ONLY (RX ONLY)
Age: 78
End: 2023-07-03

## 2023-07-03 ENCOUNTER — TELEHEALTH (OUTPATIENT)
Dept: URBAN - METROPOLITAN AREA CLINIC 71 | Facility: CLINIC | Age: 78
Setting detail: OPHTHALMOLOGY
End: 2023-07-03
Payer: MEDICARE

## 2023-07-03 DIAGNOSIS — H04.123: ICD-10-CM

## 2023-07-03 DIAGNOSIS — H01.004: ICD-10-CM

## 2023-07-03 DIAGNOSIS — H16.223: ICD-10-CM

## 2023-07-03 DIAGNOSIS — H43.813: ICD-10-CM

## 2023-07-03 DIAGNOSIS — H01.001: ICD-10-CM

## 2023-07-03 DIAGNOSIS — H25.13: ICD-10-CM

## 2023-07-03 DIAGNOSIS — H18.513: ICD-10-CM

## 2023-07-03 DIAGNOSIS — H01.002: ICD-10-CM

## 2023-07-03 DIAGNOSIS — H01.005: ICD-10-CM

## 2023-07-03 DIAGNOSIS — H40.1131: ICD-10-CM

## 2023-07-03 PROCEDURE — 99213 OFFICE O/P EST LOW 20 MIN: CPT | Performed by: OPHTHALMOLOGY

## 2023-07-03 PROCEDURE — 92083 EXTENDED VISUAL FIELD XM: CPT | Performed by: OPHTHALMOLOGY

## 2023-07-03 ASSESSMENT — LID EXAM ASSESSMENTS
OD_BLEPHARITIS: RLL RUL 1+
OD_EDEMA: RLL RUL 1+
OS_BLEPHARITIS: LLL LUL 1+

## 2023-07-03 ASSESSMENT — KERATOMETRY
OS_AXISANGLE_DEGREES: 108
OD_K2POWER_DIOPTERS: 49.13
OS_K2POWER_DIOPTERS: 49.63
OS_K1POWER_DIOPTERS: 48.91
OD_K1POWER_DIOPTERS: 48.49
OD_AXISANGLE_DEGREES: 143

## 2023-07-03 ASSESSMENT — REFRACTION_MANIFEST
OD_VA1: 20/40-
OS_SPHERE: -3.25
OD_AXIS: 165
OS_VA1: 20/25
OD_CYLINDER: -1.25
OD_SPHERE: -3.50

## 2023-07-03 ASSESSMENT — REFRACTION_CURRENTRX
OD_OVR_VA: 20/
OS_SPHERE: -3.75
OD_SPHERE: -4.50
OD_CYLINDER: -1.50
OS_OVR_VA: 20/
OD_AXIS: 168

## 2023-07-03 ASSESSMENT — PACHYMETRY
OS_CT_UM: 571
OS_CT_CORRECTION: -2
OD_CT_CORRECTION: -2
OD_CT_UM: 573

## 2023-07-03 ASSESSMENT — CORNEAL DYSTROPHY - POSTERIOR
OD_POSTERIORDYSTROPHY: GUTTATA
OS_POSTERIORDYSTROPHY: 1+ GUTTATA

## 2023-07-03 ASSESSMENT — SPHEQUIV_DERIVED
OD_SPHEQUIV: -4
OD_SPHEQUIV: -4.125

## 2023-07-03 ASSESSMENT — VISUAL ACUITY
OS_BCVA: 20/50
OD_BCVA: 20/25-

## 2023-07-03 ASSESSMENT — CONFRONTATIONAL VISUAL FIELD TEST (CVF)
OS_FINDINGS: FULL
OD_FINDINGS: FULL

## 2023-07-03 ASSESSMENT — TEAR BREAK UP TIME (TBUT)
OD_TBUT: 1+
OS_TBUT: 1+

## 2023-07-03 ASSESSMENT — AXIALLENGTH_DERIVED
OD_AL: 23.25
OD_AL: 23.21

## 2023-07-03 ASSESSMENT — REFRACTION_AUTOREFRACTION
OD_AXIS: 043
OD_SPHERE: -3.50
OD_CYLINDER: -1.00
OS_SPHERE: -4.00

## 2023-07-03 ASSESSMENT — TONOMETRY
OD_IOP_MMHG: 10
OS_IOP_MMHG: 10

## 2023-07-20 ENCOUNTER — APPOINTMENT (OUTPATIENT)
Dept: PULMONOLOGY | Facility: CLINIC | Age: 78
End: 2023-07-20
Payer: MEDICARE

## 2023-07-20 VITALS
OXYGEN SATURATION: 92 % | DIASTOLIC BLOOD PRESSURE: 59 MMHG | RESPIRATION RATE: 16 BRPM | BODY MASS INDEX: 26.91 KG/M2 | SYSTOLIC BLOOD PRESSURE: 120 MMHG | WEIGHT: 150 LBS | HEART RATE: 78 BPM | HEIGHT: 62.5 IN

## 2023-07-20 VITALS — OXYGEN SATURATION: 95 %

## 2023-07-20 VITALS — WEIGHT: 150 LBS | HEIGHT: 62.5 IN | BODY MASS INDEX: 26.91 KG/M2

## 2023-07-20 DIAGNOSIS — J45.30 MILD PERSISTENT ASTHMA, UNCOMPLICATED: ICD-10-CM

## 2023-07-20 PROCEDURE — 99213 OFFICE O/P EST LOW 20 MIN: CPT | Mod: 25

## 2023-07-20 PROCEDURE — 94010 BREATHING CAPACITY TEST: CPT

## 2023-07-20 NOTE — HISTORY OF PRESENT ILLNESS
[Never] : never [TextBox_4] : no sig dyspnea\par No fever chills or chest pain\par no sputum\par using Wixella bid\par more frequent rescue use\par no new env exposures, no sputum or rhinitis\par anemia stable saw Hematology\par  seronegative RA, on  Orencia\par \par  [FreeTextEntry1] : \par

## 2023-07-20 NOTE — DISCUSSION/SUMMARY
[FreeTextEntry1] : Asthma with reversible restriction, more recent rescue use by hx\par Spirometry stable, mild restriction\par remains on Advair bid, prn recsue neb and HFA\par No active rhinitis/GERD\par exam without bronchospasm, normal sp02\par recent CT chest 2/21  with stable apical scarring 5 mm ( 8/19-2/21, Quantiferon negative)\par Cardiology following\par CXR 8/22 negative\par 6 months or sooner if needed, she will call if any change in status\par

## 2023-07-20 NOTE — PROCEDURE
[FreeTextEntry1] : CT 2/21: stable nodule, 5mm apical nodule , no change  1/20\par CXR 8/22 negative\par spirometry mild restriction no sig change from 1/23

## 2023-07-20 NOTE — CONSULT LETTER
[Dear  ___] : Dear  [unfilled], [Consult Letter:] : I had the pleasure of evaluating your patient, [unfilled]. [Please see my note below.] : Please see my note below. [Sincerely,] : Sincerely, [DrBarbara  ___] : Dr. HILL [[facility], [facility]] : [facility] DO, [facility] [Director, Respiratory Care] : Director, Respiratory Care [[facility]] : [facility] [FreeTextEntry3] : [facility] DO [facility]\par Pulmonary Critical Care\par Director Pulmonary Division\par Medical Director Respiratory Therapy\par [facility]\par \par

## 2023-08-02 ENCOUNTER — RX RENEWAL (OUTPATIENT)
Age: 78
End: 2023-08-02

## 2023-08-02 RX ORDER — FLUTICASONE PROPIONATE AND SALMETEROL 250; 50 UG/1; UG/1
250-50 POWDER RESPIRATORY (INHALATION)
Qty: 3 | Refills: 3 | Status: ACTIVE | COMMUNITY
Start: 2022-08-11 | End: 1900-01-01

## 2023-09-15 ENCOUNTER — OFFICE (OUTPATIENT)
Dept: URBAN - METROPOLITAN AREA CLINIC 63 | Facility: CLINIC | Age: 78
Setting detail: OPHTHALMOLOGY
End: 2023-09-15
Payer: MEDICARE

## 2023-09-15 DIAGNOSIS — H35.3131: ICD-10-CM

## 2023-09-15 DIAGNOSIS — H35.371: ICD-10-CM

## 2023-09-15 PROCEDURE — 92134 CPTRZ OPH DX IMG PST SGM RTA: CPT | Performed by: SPECIALIST

## 2023-09-15 PROCEDURE — 92014 COMPRE OPH EXAM EST PT 1/>: CPT | Performed by: SPECIALIST

## 2023-09-15 PROCEDURE — 92235 FLUORESCEIN ANGRPH MLTIFRAME: CPT | Performed by: SPECIALIST

## 2023-09-15 ASSESSMENT — CONFRONTATIONAL VISUAL FIELD TEST (CVF)
OS_FINDINGS: FULL
OD_FINDINGS: FULL

## 2023-09-15 ASSESSMENT — VISUAL ACUITY
OS_BCVA: 20/30-1
OD_BCVA: 20/30+1

## 2023-09-15 ASSESSMENT — TONOMETRY
OD_IOP_MMHG: 15
OS_IOP_MMHG: 16

## 2023-09-15 ASSESSMENT — CORNEAL DYSTROPHY - POSTERIOR
OS_POSTERIORDYSTROPHY: GUTTATA
OD_POSTERIORDYSTROPHY: GUTTATA

## 2023-11-13 ENCOUNTER — OFFICE (OUTPATIENT)
Dept: URBAN - METROPOLITAN AREA CLINIC 104 | Facility: CLINIC | Age: 78
Setting detail: OPHTHALMOLOGY
End: 2023-11-13
Payer: MEDICARE

## 2023-11-13 DIAGNOSIS — H35.371: ICD-10-CM

## 2023-11-13 DIAGNOSIS — H35.3131: ICD-10-CM

## 2023-11-13 DIAGNOSIS — H25.13: ICD-10-CM

## 2023-11-13 PROCEDURE — 92250 FUNDUS PHOTOGRAPHY W/I&R: CPT | Performed by: OPHTHALMOLOGY

## 2023-11-13 PROCEDURE — 99213 OFFICE O/P EST LOW 20 MIN: CPT | Performed by: OPHTHALMOLOGY

## 2023-11-13 ASSESSMENT — CORNEAL DYSTROPHY - POSTERIOR
OD_POSTERIORDYSTROPHY: GUTTATA
OS_POSTERIORDYSTROPHY: GUTTATA

## 2023-11-13 ASSESSMENT — CONFRONTATIONAL VISUAL FIELD TEST (CVF)
OS_FINDINGS: FULL
OD_FINDINGS: FULL

## 2023-12-31 PROBLEM — Z23 NEED FOR VACCINATION WITH 13-POLYVALENT PNEUMOCOCCAL CONJUGATE VACCINE: Status: ACTIVE | Noted: 2018-01-08

## 2024-07-19 ENCOUNTER — OFFICE (OUTPATIENT)
Dept: URBAN - METROPOLITAN AREA CLINIC 104 | Facility: CLINIC | Age: 79
Setting detail: OPHTHALMOLOGY
End: 2024-07-19
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
## 2024-07-19 PROCEDURE — NO SHOW FE NO SHOW FEE: Performed by: OPHTHALMOLOGY

## 2024-07-26 ENCOUNTER — APPOINTMENT (OUTPATIENT)
Dept: PULMONOLOGY | Facility: CLINIC | Age: 79
End: 2024-07-26